# Patient Record
Sex: FEMALE | Race: WHITE
[De-identification: names, ages, dates, MRNs, and addresses within clinical notes are randomized per-mention and may not be internally consistent; named-entity substitution may affect disease eponyms.]

---

## 2017-10-02 PROBLEM — J30.1 ALLERGIC RHINITIS DUE TO POLLEN, UNSPECIFIED CHRONICITY, UNSPECIFIED SEASONALITY: Status: ACTIVE | Noted: 2017-10-02

## 2017-10-02 PROCEDURE — 87624 HPV HI-RISK TYP POOLED RSLT: CPT | Performed by: FAMILY MEDICINE

## 2017-10-02 PROCEDURE — 88175 CYTOPATH C/V AUTO FLUID REDO: CPT | Performed by: FAMILY MEDICINE

## 2017-10-02 PROCEDURE — 81003 URINALYSIS AUTO W/O SCOPE: CPT | Performed by: FAMILY MEDICINE

## 2017-10-05 PROBLEM — R73.03 PREDIABETES: Status: ACTIVE | Noted: 2017-01-01

## 2019-01-08 ENCOUNTER — MYAURORA ACCOUNT LINK (OUTPATIENT)
Dept: OTHER | Age: 64
End: 2019-01-08

## 2019-01-08 ENCOUNTER — HOSPITAL ENCOUNTER (OUTPATIENT)
Dept: CV DIAGNOSTICS | Age: 64
Discharge: HOME | End: 2019-01-08
Attending: INTERNAL MEDICINE
Payer: MEDICARE

## 2019-01-08 ENCOUNTER — PRIOR ORIGINAL RECORDS (OUTPATIENT)
Dept: OTHER | Age: 64
End: 2019-01-08

## 2019-01-08 DIAGNOSIS — I35.0 AORTIC VALVE STENOSIS, ETIOLOGY OF CARDIAC VALVE DISEASE UNSPECIFIED: ICD-10-CM

## 2019-01-09 ENCOUNTER — PRIOR ORIGINAL RECORDS (OUTPATIENT)
Dept: OTHER | Age: 64
End: 2019-01-09

## 2019-01-28 ENCOUNTER — MYAURORA ACCOUNT LINK (OUTPATIENT)
Dept: OTHER | Age: 64
End: 2019-01-28

## 2019-01-28 ENCOUNTER — PRIOR ORIGINAL RECORDS (OUTPATIENT)
Dept: OTHER | Age: 64
End: 2019-01-28

## 2019-01-28 LAB
ALT (SGPT): 17 U/L
AST (SGOT): 23 U/L
CHOLESTEROL, TOTAL: 171 MG/DL
GLUCOSE: 96 MG/DL
HDL CHOLESTEROL: 50 MG/DL
HEMOGLOBIN A1C: 5.9 %
LDL CHOLESTEROL: 99 MG/DL
THYROID STIMULATING HORMONE: 0.78 MLU/L
TRIGLYCERIDES: 112 MG/DL

## 2019-02-14 LAB
BUN: 12 MG/DL
CALCIUM: 9.5 MG/DL
CHLORIDE: 100 MEQ/L
CREATININE, SERUM: 0.62 MG/DL
GLUCOSE: 96 MG/DL
HEMATOCRIT: 40.4 %
HEMOGLOBIN: 13.1 G/DL
PLATELETS: 317 K/UL
POTASSIUM, SERUM: 4.6 MEQ/L
RED BLOOD COUNT: 4.48 X 10-6/U
SGOT (AST): 23 IU/L
SGPT (ALT): 17 IU/L
SODIUM: 137 MEQ/L
WHITE BLOOD COUNT: 6.21 X 10-3/U

## 2019-02-28 VITALS
DIASTOLIC BLOOD PRESSURE: 82 MMHG | HEIGHT: 66 IN | BODY MASS INDEX: 28.12 KG/M2 | SYSTOLIC BLOOD PRESSURE: 142 MMHG | HEART RATE: 84 BPM | WEIGHT: 175 LBS

## 2019-07-30 PROCEDURE — 88305 TISSUE EXAM BY PATHOLOGIST: CPT | Performed by: INTERNAL MEDICINE

## 2020-03-12 PROBLEM — J30.1 ALLERGIC RHINITIS DUE TO POLLEN: Status: ACTIVE | Noted: 2017-10-02

## 2021-04-12 PROBLEM — M17.11 OSTEOARTHRITIS OF RIGHT KNEE, UNSPECIFIED OSTEOARTHRITIS TYPE: Status: ACTIVE | Noted: 2021-04-12

## 2021-05-11 ENCOUNTER — OFFICE VISIT (OUTPATIENT)
Dept: NEUROLOGY | Facility: CLINIC | Age: 66
End: 2021-05-11
Payer: MEDICARE

## 2021-05-11 ENCOUNTER — TELEPHONE (OUTPATIENT)
Dept: PHYSICAL THERAPY | Facility: HOSPITAL | Age: 66
End: 2021-05-11

## 2021-05-11 VITALS
RESPIRATION RATE: 16 BRPM | BODY MASS INDEX: 29 KG/M2 | DIASTOLIC BLOOD PRESSURE: 82 MMHG | WEIGHT: 176 LBS | SYSTOLIC BLOOD PRESSURE: 142 MMHG | HEART RATE: 70 BPM

## 2021-05-11 DIAGNOSIS — R29.898 WEAKNESS OF RIGHT HAND: Primary | ICD-10-CM

## 2021-05-11 DIAGNOSIS — M54.2 NECK PAIN: ICD-10-CM

## 2021-05-11 DIAGNOSIS — M47.22 OSTEOARTHRITIS OF SPINE WITH RADICULOPATHY, CERVICAL REGION: ICD-10-CM

## 2021-05-11 DIAGNOSIS — M25.521 RIGHT ELBOW PAIN: ICD-10-CM

## 2021-05-11 PROCEDURE — 99204 OFFICE O/P NEW MOD 45 MIN: CPT | Performed by: OTHER

## 2021-05-11 NOTE — PROGRESS NOTES
[facility]   Neurology; INITIAL CLINIC VISIT  2021, 11:08 AM     Courtney Alva Patient Status:  No patient class for patient encounter    1955 MRN [id number]   Location [facility] Surgeon: Phylicia Parnell MD;  Location: [address] [place] [address]   • CT HEART W/ CALCIUM SCORING  10/2009    0   • EGD  07/2019    MAC: gastritis, gastric polyps, esophagitis (LA grade A), non obstructive esophageal stricture, hiatal hernia, dilated to mg total) by mouth nightly. 90 tablet 3   • Levothyroxine Sodium 100 MCG Oral Tab Take 1 tablet (100 mcg total) by mouth before breakfast. 90 tablet 3   • amLODIPine Besylate 5 MG Oral Tab Take 1 tablet (5 mg total) by mouth daily.  90 tablet 1   • Rosuvast depression or anxiety  HEMATOLOGY:  denies bruising or excessive bleeding  ENDOCRINE: denies excessive thirst or urination; denies unexpected wt gain or wt loss  ALLERGY/IMM.: denies food or seasonal allergies      PHYSICAL EXAMINATION:  VITAL SIGNS: BP 14 in both arms and legs, including biceps, triceps, knees, ankles,  Babinski sign is negative;   Coordination: Normal FTN     Gait: Normal based,  Romberg sign is negative, Tandem walk is normal      LABS:     Reviewed with patient      IMAGING: reviewed film patient presented with weakness and atrophy in her right FDI and intrinsic hand muscle and R. Elbow pain,   Although EMG did not show ulnar neuropathy, but I suspected she has R.  Ulnar neuropathy at elbow or lower cervical radiculopathy,   MRI cervical spi

## 2021-05-11 NOTE — PROGRESS NOTES
Patient would like to discuss EMG results from 04/08/2021. Patient states right sided elbow, shoulder and neck pain.

## 2021-06-07 ENCOUNTER — APPOINTMENT (OUTPATIENT)
Dept: PHYSICAL THERAPY | Facility: HOSPITAL | Age: 66
End: 2021-06-07
Attending: Other
Payer: MEDICARE

## 2021-06-11 ENCOUNTER — APPOINTMENT (OUTPATIENT)
Dept: PHYSICAL THERAPY | Facility: HOSPITAL | Age: 66
End: 2021-06-11
Attending: Other
Payer: MEDICARE

## 2021-06-15 ENCOUNTER — APPOINTMENT (OUTPATIENT)
Dept: PHYSICAL THERAPY | Facility: HOSPITAL | Age: 66
End: 2021-06-15
Payer: MEDICARE

## 2021-06-18 ENCOUNTER — APPOINTMENT (OUTPATIENT)
Dept: PHYSICAL THERAPY | Facility: HOSPITAL | Age: 66
End: 2021-06-18
Payer: MEDICARE

## 2021-06-22 ENCOUNTER — APPOINTMENT (OUTPATIENT)
Dept: PHYSICAL THERAPY | Facility: HOSPITAL | Age: 66
End: 2021-06-22
Payer: MEDICARE

## 2021-06-25 ENCOUNTER — APPOINTMENT (OUTPATIENT)
Dept: PHYSICAL THERAPY | Facility: HOSPITAL | Age: 66
End: 2021-06-25
Payer: MEDICARE

## 2021-06-29 ENCOUNTER — APPOINTMENT (OUTPATIENT)
Dept: PHYSICAL THERAPY | Facility: HOSPITAL | Age: 66
End: 2021-06-29
Payer: MEDICARE

## 2021-07-02 ENCOUNTER — APPOINTMENT (OUTPATIENT)
Dept: PHYSICAL THERAPY | Facility: HOSPITAL | Age: 66
End: 2021-07-02
Payer: MEDICARE

## 2021-07-27 ENCOUNTER — HOSPITAL ENCOUNTER (OUTPATIENT)
Dept: LAB | Facility: HOSPITAL | Age: 66
Discharge: HOME | End: 2021-07-27
Attending: Other
Payer: MEDICARE

## 2021-07-27 LAB
RHEUMATOID FACT SERPL-ACNC: <10 IU/ML (ref ?–15)
SED RATE-ML: 17 MM/HR

## 2021-07-27 PROCEDURE — 85652 RBC SED RATE AUTOMATED: CPT | Performed by: OTHER

## 2021-07-27 PROCEDURE — 86431 RHEUMATOID FACTOR QUANT: CPT | Performed by: OTHER

## 2021-07-27 PROCEDURE — 36415 COLL VENOUS BLD VENIPUNCTURE: CPT | Performed by: OTHER

## 2021-07-28 ENCOUNTER — TELEPHONE (OUTPATIENT)
Dept: NEUROLOGY | Facility: CLINIC | Age: 66
End: 2021-07-28

## 2021-07-28 ENCOUNTER — OFFICE VISIT (OUTPATIENT)
Dept: ELECTROPHYSIOLOGY | Facility: HOSPITAL | Age: 66
End: 2021-07-28
Attending: Other
Payer: MEDICARE

## 2021-07-28 DIAGNOSIS — M54.2 NECK PAIN: Primary | ICD-10-CM

## 2021-07-28 DIAGNOSIS — M47.22 OSTEOARTHRITIS OF SPINE WITH RADICULOPATHY, CERVICAL REGION: Primary | ICD-10-CM

## 2021-07-28 DIAGNOSIS — R29.898 WEAKNESS OF RIGHT HAND: ICD-10-CM

## 2021-07-28 DIAGNOSIS — M25.521 RIGHT ELBOW PAIN: ICD-10-CM

## 2021-07-28 PROCEDURE — 95886 MUSC TEST DONE W/N TEST COMP: CPT | Performed by: OTHER

## 2021-07-28 PROCEDURE — 95909 NRV CNDJ TST 5-6 STUDIES: CPT | Performed by: OTHER

## 2021-07-28 NOTE — TELEPHONE ENCOUNTER
Patient had EMG with Dr. Tenzin Gamble today. Doctor asked patient to call our office for a new referral for PT. Patient requests that a MyChart confirmation is sent when order is placed.  She is also asking if there is someone within [facility] PT that we recommend fo

## 2021-07-29 NOTE — PROCEDURES
[facility], [address] [place] [address]      PATIENT'S NAME: Carey Cardozo   REFERRING PHYSICIAN: Darrell Nickerson M.D.    PATIENT ACCOUNT #: [de-identified] LOCATION: [facility]   MEDICAL RECORD #: [id number] DATE OF BIRTH: [birth year] the right ulnar nerve, but the conduction velocity was normal though.   Significant denervation change in the right first dorsal interosseous muscle may be due to a combination of right lower cervical radiculopathy, as well as mild entrapment neuropathy at

## 2021-07-30 NOTE — TELEPHONE ENCOUNTER
Pt needs a new order for PT; pt would like to go to a [facility] PT; pls call her or send her a [employer] message when the order is in; pt is hoping that can be done today.

## 2021-08-16 PROBLEM — M54.12 CERVICAL RADICULOPATHY: Status: ACTIVE | Noted: 2021-08-16

## 2021-08-17 ENCOUNTER — OFFICE VISIT (OUTPATIENT)
Dept: NEUROLOGY | Facility: CLINIC | Age: 66
End: 2021-08-17
Payer: MEDICARE

## 2021-08-17 VITALS
SYSTOLIC BLOOD PRESSURE: 110 MMHG | RESPIRATION RATE: 16 BRPM | WEIGHT: 180 LBS | BODY MASS INDEX: 30 KG/M2 | HEART RATE: 84 BPM | DIASTOLIC BLOOD PRESSURE: 78 MMHG

## 2021-08-17 DIAGNOSIS — M54.2 NECK PAIN: ICD-10-CM

## 2021-08-17 DIAGNOSIS — G56.21 ULNAR NEUROPATHY AT ELBOW, RIGHT: ICD-10-CM

## 2021-08-17 DIAGNOSIS — R29.898 WEAKNESS OF RIGHT HAND: ICD-10-CM

## 2021-08-17 DIAGNOSIS — M47.22 OSTEOARTHRITIS OF SPINE WITH RADICULOPATHY, CERVICAL REGION: ICD-10-CM

## 2021-08-17 DIAGNOSIS — M54.12 CERVICAL RADICULOPATHY: Primary | ICD-10-CM

## 2021-08-17 DIAGNOSIS — M25.521 RIGHT ELBOW PAIN: ICD-10-CM

## 2021-08-17 PROBLEM — M15.1 DEGENERATIVE ARTHRITIS OF DISTAL INTERPHALANGEAL JOINT OF INDEX FINGER OF LEFT HAND: Status: ACTIVE | Noted: 2021-08-17

## 2021-08-17 PROBLEM — M77.8 RIGHT ELBOW TENDONITIS: Status: ACTIVE | Noted: 2021-08-17

## 2021-08-17 PROCEDURE — 99214 OFFICE O/P EST MOD 30 MIN: CPT | Performed by: OTHER

## 2021-08-17 NOTE — PROGRESS NOTES
[facility]   Neurology; follow up CLINIC VISIT  2021    Margoth Tillman Patient Status:  No patient class for patient encounter    1955 MRN [id number]   Location [address] [place] [address], [address] [place] [address], [address] [place] [address] PCP [facility]ol[facility] 2029   • COLONOSCOPY,BIOPSY N/A 12/30/2014    Procedure: ESOPHAGOGASTRODUODENOSCOPY, COLONOSCOPY, POSSIBLE BIOPSY, POSSIBLE POLYPECTOMY 90723,30398;  Surgeon: Scarlett Gutierrez MD;  Location: [address] [place] [address]   • CT HEART W/ CALCIUM SCORING  10/2 mouth nightly as needed.  ) 90 tablet 3   • Levothyroxine Sodium 100 MCG Oral Tab Take 1 tablet (100 mcg total) by mouth before breakfast. 90 tablet 3   • amLODIPine Besylate 5 MG Oral Tab Take 1 tablet (5 mg total) by mouth daily.  90 tablet 1   • Rosuvast Location: Right arm, Patient Position: Sitting, Cuff Size: adult)   Pulse 84   Resp 16   Wt 180 lb (81.6 kg)   LMP 12/19/2012   BMI 29.95 kg/m²    Body mass index is 29.95 kg/m². General:  Patient is a 77year old female in no acute distress.   appearance: LABS:   repeated ESR is 17, normal now,   Reviewed with patient      IMAGING: reviewed film or imaging with patient. EMG done by Dr. Vish Smith on 4/6/2021  This is an abnormal study.      1.  There is electrodiagnostic evidence of right median nerve mon Impression/Plan:  (M54.12) Cervical radiculopathy  (primary encounter diagnosis)    (M25.521) Right elbow pain    (R29.898) Weakness of right hand    (M54.2) Neck pain    (M47.22) Osteoarthritis of spine with radiculopathy, cervical region    (G56.21

## 2024-01-08 RX ORDER — AMLODIPINE BESYLATE 5 MG/1
TABLET ORAL
COMMUNITY
End: 2024-01-08 | Stop reason: ALTCHOICE

## 2024-01-08 RX ORDER — ASPIRIN 81 MG/1
81 TABLET ORAL DAILY
Status: ON HOLD | COMMUNITY
End: 2024-02-21

## 2024-01-08 RX ORDER — FAMOTIDINE 20 MG/1
20 TABLET, FILM COATED ORAL 2 TIMES DAILY PRN
COMMUNITY

## 2024-02-12 ENCOUNTER — ANESTHESIA EVENT (OUTPATIENT)
Dept: SURGERY | Facility: HOSPITAL | Age: 69
End: 2024-02-12
Payer: MEDICARE

## 2024-02-14 NOTE — H&P
[facility]  History & Physical    Tita Tillman Patient Status:  Hospital Outpatient Surgery    1955 MRN [id number]   Location [facility] SURGERY Attending Tod Ferreira MD   Hosp Day # 0 PCP Lydia Newton MD     Date of Admission:  (Not on file)    History of Present Illness:  Tita Tillman is a(n) 68 year old female.  The patient has failed conservative treatment for right knee osteoarthritis and has significant limitations in ADL's including ambulation and exercise, and presents for total joint arthroplasty at this time.    History:  Past Medical History:   Diagnosis Date    Aortic valve disorder     ASTHMA     Asthma     GERD     High blood pressure     High cholesterol     Hx of adenomatous colonic polyps     Hypercholesterolemia     Hypertension     Moderate aortic stenosis     Occlusion and stenosis of carotid artery without mention of cerebral infarction     Other and unspecified hyperlipidemia 2008    Prediabetes     SEASONAL ALLERGIES     Unspecified hypothyroidism 2008     Past Surgical History:   Procedure Laterality Date    COLONOSCOPY  2019    MAC: diverticulosis, int hem, repeat     COLONOSCOPY,BIOPSY N/A 2014    Procedure: ESOPHAGOGASTRODUODENOSCOPY, COLONOSCOPY, POSSIBLE BIOPSY, POSSIBLE POLYPECTOMY 91879,39417;  Surgeon: Katarina Baeza MD;  Location: [facility]    CT HEART W/ CALCIUM SCORING  10/2009    0    EGD  2019    MAC: gastritis, gastric polyps, esophagitis (LA grade A), non obstructive esophageal stricture, hiatal hernia, dilated to 20mm, bx neg for HP, celiac, Lazar's. Polyps are fundic gland polyps    EGD N/A 2019    Procedure: ESOPHAGOGASTRODUODENOSCOPY, COLONOSCOPY, POSSIBLE BIOPSY, POSSIBLE POLYPECTOMY 06175, 03629;  Surgeon: Katarina Baeza MD;  Location: [facility]    OTHER SURGICAL HISTORY      oral    UPPER GI ENDOSCOPY,BIOPSY N/A 2014    Procedure:  ESOPHAGOGASTRODUODENOSCOPY, COLONOSCOPY, POSSIBLE BIOPSY, POSSIBLE POLYPECTOMY 94818,27097;  Surgeon: Katarina Baeza MD;  Location: [facility]     Family History   Problem Relation Age of Onset    Heart Attack Father     Heart Disorder Father     Cancer Father         melanoma    High Blood Pressure Mother     High Cholesterol Mother     Hypertension Mother     Other (Other) Mother         arthritis-hammer toes-hypothyroid    Neurological Disorder Brother         SUBARRACHNOID HEMORRHAGE    Neurological Disorder Son         RACHELE      reports that she has never smoked. She has never used smokeless tobacco. She reports current alcohol use. She reports that she does not use drugs.    Allergies:  Allergies   Allergen Reactions    Cyclobenzaprine HIVES    Tizanidine HIVES    Zithromax HIVES    Fish Oil HIVES    Nuts     Pcn [Penicillins] HIVES    Strawberries HIVES    Tums [Calcium Carbonate Antacid] HIVES    Erythromycin NAUSEA ONLY    Polymyxin B NAUSEA ONLY       Home Medications:  No medications prior to admission.       Physical Exam:   General: Alert, orientated x3.  Cooperative.  No apparent distress.  Vital Signs:  Height 5' 5\" (1.651 m), weight 177 lb (80.3 kg), last menstrual period 12/10/2012, not currently breastfeeding.  HEENT: Exam is unremarkable.    Neck: No tenderness to palpitation. No JVD. Supple.   Lungs: Clear to auscultation bilaterally.  Cardiac: Regular rate and rhythm. No murmur.  Abdomen:  Soft, non-distended, non-tender, with no rebound or guarding.   Extremities:  No lower extremity edema noted.  Without clubbing or cyanosis.    The right knee is tender at the medial and lateral joint lines, with crepitus on range of motion    Laboratory Data:  xrays show severe osteoarthritis of the right knee    Impression and Plan:  Patient Active Problem List   Diagnosis    Mixed hyperlipidemia    Moderate aortic stenosis    Osteoarthrosis, unspecified whether generalized or localized,  lower leg    Enthesopathy of hip region    Asymptomatic carotid artery stenosis, unspecified laterality    Hx of adenomatous colonic polyps    Hypothyroidism, unspecified type    Essential hypertension    Mild intermittent asthma without complication    Gastroesophageal reflux disease    Allergic rhinitis due to pollen    Prediabetes    Osteoarthritis of right knee, unspecified osteoarthritis type    Weakness of right hand    Neck pain    Osteoarthritis of spine with radiculopathy, cervical region    Right elbow pain    Cervical radiculopathy    Ulnar neuropathy at elbow, right    Right elbow tendonitis    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

## 2024-02-21 ENCOUNTER — ANESTHESIA (OUTPATIENT)
Dept: SURGERY | Facility: HOSPITAL | Age: 69
End: 2024-02-21
Payer: MEDICARE

## 2024-02-21 ENCOUNTER — HOSPITAL ENCOUNTER (OUTPATIENT)
Facility: HOSPITAL | Age: 69
Setting detail: HOSPITAL OUTPATIENT SURGERY
Discharge: HOME HEALTH | End: 2024-02-21
Attending: ORTHOPAEDIC SURGERY | Admitting: ORTHOPAEDIC SURGERY
Payer: MEDICARE

## 2024-02-21 VITALS
RESPIRATION RATE: 16 BRPM | TEMPERATURE: 99 F | OXYGEN SATURATION: 99 % | HEART RATE: 88 BPM | WEIGHT: 170.25 LBS | BODY MASS INDEX: 28.36 KG/M2 | SYSTOLIC BLOOD PRESSURE: 135 MMHG | HEIGHT: 65 IN | DIASTOLIC BLOOD PRESSURE: 80 MMHG

## 2024-02-21 LAB
ANTIBODY SCREEN: NEGATIVE
RH BLOOD TYPE: POSITIVE

## 2024-02-21 PROCEDURE — 86901 BLOOD TYPING SEROLOGIC RH(D): CPT | Performed by: ORTHOPAEDIC SURGERY

## 2024-02-21 PROCEDURE — 88311 DECALCIFY TISSUE: CPT | Performed by: ORTHOPAEDIC SURGERY

## 2024-02-21 PROCEDURE — 86900 BLOOD TYPING SEROLOGIC ABO: CPT | Performed by: ORTHOPAEDIC SURGERY

## 2024-02-21 PROCEDURE — 97116 GAIT TRAINING THERAPY: CPT

## 2024-02-21 PROCEDURE — 97161 PT EVAL LOW COMPLEX 20 MIN: CPT

## 2024-02-21 PROCEDURE — 76942 ECHO GUIDE FOR BIOPSY: CPT | Performed by: ANESTHESIOLOGY

## 2024-02-21 PROCEDURE — 86850 RBC ANTIBODY SCREEN: CPT | Performed by: ORTHOPAEDIC SURGERY

## 2024-02-21 PROCEDURE — 0SRC0J9 REPLACEMENT OF RIGHT KNEE JOINT WITH SYNTHETIC SUBSTITUTE, CEMENTED, OPEN APPROACH: ICD-10-PCS | Performed by: ORTHOPAEDIC SURGERY

## 2024-02-21 PROCEDURE — 88305 TISSUE EXAM BY PATHOLOGIST: CPT | Performed by: ORTHOPAEDIC SURGERY

## 2024-02-21 DEVICE — IMPLANTABLE DEVICE: Type: IMPLANTABLE DEVICE | Site: KNEE | Status: FUNCTIONAL

## 2024-02-21 RX ORDER — ACETAMINOPHEN 325 MG/1
TABLET ORAL
Status: COMPLETED
Start: 2024-02-21 | End: 2024-02-21

## 2024-02-21 RX ORDER — SENNOSIDES 8.6 MG
17.2 TABLET ORAL NIGHTLY
Status: CANCELLED | OUTPATIENT
Start: 2024-02-21

## 2024-02-21 RX ORDER — DEXAMETHASONE SODIUM PHOSPHATE 10 MG/ML
INJECTION, SOLUTION INTRAMUSCULAR; INTRAVENOUS AS NEEDED
Status: DISCONTINUED | OUTPATIENT
Start: 2024-02-21 | End: 2024-02-21 | Stop reason: SURG

## 2024-02-21 RX ORDER — MEPERIDINE HYDROCHLORIDE 25 MG/ML
12.5 INJECTION INTRAMUSCULAR; INTRAVENOUS; SUBCUTANEOUS AS NEEDED
Status: DISCONTINUED | OUTPATIENT
Start: 2024-02-21 | End: 2024-02-21

## 2024-02-21 RX ORDER — ENEMA 19; 7 G/133ML; G/133ML
1 ENEMA RECTAL ONCE AS NEEDED
Status: CANCELLED | OUTPATIENT
Start: 2024-02-21

## 2024-02-21 RX ORDER — DEXAMETHASONE SODIUM PHOSPHATE 10 MG/ML
8 INJECTION, SOLUTION INTRAMUSCULAR; INTRAVENOUS ONCE
OUTPATIENT
Start: 2024-02-22 | End: 2024-02-22

## 2024-02-21 RX ORDER — HYDROMORPHONE HYDROCHLORIDE 1 MG/ML
0.4 INJECTION, SOLUTION INTRAMUSCULAR; INTRAVENOUS; SUBCUTANEOUS EVERY 5 MIN PRN
Status: DISCONTINUED | OUTPATIENT
Start: 2024-02-21 | End: 2024-02-21

## 2024-02-21 RX ORDER — POLYETHYLENE GLYCOL 3350 17 G/17G
17 POWDER, FOR SOLUTION ORAL DAILY PRN
Status: CANCELLED | OUTPATIENT
Start: 2024-02-21

## 2024-02-21 RX ORDER — NALOXONE HYDROCHLORIDE 0.4 MG/ML
80 INJECTION, SOLUTION INTRAMUSCULAR; INTRAVENOUS; SUBCUTANEOUS AS NEEDED
Status: DISCONTINUED | OUTPATIENT
Start: 2024-02-21 | End: 2024-02-21

## 2024-02-21 RX ORDER — OXYCODONE HYDROCHLORIDE 5 MG/1
5 TABLET ORAL EVERY 4 HOURS PRN
OUTPATIENT
Start: 2024-02-21

## 2024-02-21 RX ORDER — LABETALOL HYDROCHLORIDE 5 MG/ML
5 INJECTION, SOLUTION INTRAVENOUS EVERY 5 MIN PRN
Status: DISCONTINUED | OUTPATIENT
Start: 2024-02-21 | End: 2024-02-21

## 2024-02-21 RX ORDER — CEFAZOLIN SODIUM/WATER 2 G/20 ML
SYRINGE (ML) INTRAVENOUS
Status: DISCONTINUED
Start: 2024-02-21 | End: 2024-02-21

## 2024-02-21 RX ORDER — ROPIVACAINE HYDROCHLORIDE 5 MG/ML
INJECTION, SOLUTION EPIDURAL; INFILTRATION; PERINEURAL AS NEEDED
Status: DISCONTINUED | OUTPATIENT
Start: 2024-02-21 | End: 2024-02-21 | Stop reason: SURG

## 2024-02-21 RX ORDER — ONDANSETRON 2 MG/ML
4 INJECTION INTRAMUSCULAR; INTRAVENOUS EVERY 6 HOURS PRN
Status: CANCELLED | OUTPATIENT
Start: 2024-02-21

## 2024-02-21 RX ORDER — SODIUM CHLORIDE, SODIUM LACTATE, POTASSIUM CHLORIDE, CALCIUM CHLORIDE 600; 310; 30; 20 MG/100ML; MG/100ML; MG/100ML; MG/100ML
INJECTION, SOLUTION INTRAVENOUS CONTINUOUS
Status: DISCONTINUED | OUTPATIENT
Start: 2024-02-21 | End: 2024-02-21

## 2024-02-21 RX ORDER — METOCLOPRAMIDE HYDROCHLORIDE 5 MG/ML
10 INJECTION INTRAMUSCULAR; INTRAVENOUS EVERY 8 HOURS PRN
Status: CANCELLED | OUTPATIENT
Start: 2024-02-21

## 2024-02-21 RX ORDER — TRANEXAMIC ACID 10 MG/ML
INJECTION, SOLUTION INTRAVENOUS AS NEEDED
Status: DISCONTINUED | OUTPATIENT
Start: 2024-02-21 | End: 2024-02-21 | Stop reason: SURG

## 2024-02-21 RX ORDER — DIPHENHYDRAMINE HYDROCHLORIDE 50 MG/ML
12.5 INJECTION INTRAMUSCULAR; INTRAVENOUS EVERY 4 HOURS PRN
Status: CANCELLED | OUTPATIENT
Start: 2024-02-21

## 2024-02-21 RX ORDER — MELATONIN
325
Status: CANCELLED | OUTPATIENT
Start: 2024-02-21

## 2024-02-21 RX ORDER — PHENYLEPHRINE HCL 10 MG/ML
VIAL (ML) INJECTION AS NEEDED
Status: DISCONTINUED | OUTPATIENT
Start: 2024-02-21 | End: 2024-02-21 | Stop reason: SURG

## 2024-02-21 RX ORDER — MIDAZOLAM HYDROCHLORIDE 1 MG/ML
INJECTION INTRAMUSCULAR; INTRAVENOUS AS NEEDED
Status: DISCONTINUED | OUTPATIENT
Start: 2024-02-21 | End: 2024-02-21 | Stop reason: SURG

## 2024-02-21 RX ORDER — CEFAZOLIN SODIUM/WATER 2 G/20 ML
2 SYRINGE (ML) INTRAVENOUS EVERY 8 HOURS
Status: CANCELLED | OUTPATIENT
Start: 2024-02-21 | End: 2024-02-22

## 2024-02-21 RX ORDER — OXYCODONE HYDROCHLORIDE 5 MG/1
2.5 TABLET ORAL EVERY 4 HOURS PRN
OUTPATIENT
Start: 2024-02-21

## 2024-02-21 RX ORDER — HYDROCODONE BITARTRATE AND ACETAMINOPHEN 5; 325 MG/1; MG/1
2 TABLET ORAL ONCE AS NEEDED
Status: COMPLETED | OUTPATIENT
Start: 2024-02-21 | End: 2024-02-21

## 2024-02-21 RX ORDER — METOCLOPRAMIDE HYDROCHLORIDE 5 MG/ML
10 INJECTION INTRAMUSCULAR; INTRAVENOUS EVERY 8 HOURS PRN
Status: DISCONTINUED | OUTPATIENT
Start: 2024-02-21 | End: 2024-02-21

## 2024-02-21 RX ORDER — ALBUTEROL SULFATE 90 UG/1
2 AEROSOL, METERED RESPIRATORY (INHALATION) EVERY 4 HOURS PRN
Status: CANCELLED | OUTPATIENT
Start: 2024-02-21

## 2024-02-21 RX ORDER — HYDROMORPHONE HYDROCHLORIDE 1 MG/ML
0.6 INJECTION, SOLUTION INTRAMUSCULAR; INTRAVENOUS; SUBCUTANEOUS EVERY 5 MIN PRN
Status: DISCONTINUED | OUTPATIENT
Start: 2024-02-21 | End: 2024-02-21

## 2024-02-21 RX ORDER — TRAMADOL HYDROCHLORIDE 50 MG/1
50 TABLET ORAL EVERY 6 HOURS SCHEDULED
OUTPATIENT
Start: 2024-02-21

## 2024-02-21 RX ORDER — HYDROCODONE BITARTRATE AND ACETAMINOPHEN 5; 325 MG/1; MG/1
1 TABLET ORAL ONCE AS NEEDED
Status: COMPLETED | OUTPATIENT
Start: 2024-02-21 | End: 2024-02-21

## 2024-02-21 RX ORDER — HYDROMORPHONE HYDROCHLORIDE 1 MG/ML
0.2 INJECTION, SOLUTION INTRAMUSCULAR; INTRAVENOUS; SUBCUTANEOUS EVERY 2 HOUR PRN
OUTPATIENT
Start: 2024-02-21

## 2024-02-21 RX ORDER — ACETAMINOPHEN 325 MG/1
650 TABLET ORAL ONCE
Status: COMPLETED | OUTPATIENT
Start: 2024-02-21 | End: 2024-02-21

## 2024-02-21 RX ORDER — CEFAZOLIN SODIUM/WATER 2 G/20 ML
2 SYRINGE (ML) INTRAVENOUS ONCE
Status: COMPLETED | OUTPATIENT
Start: 2024-02-21 | End: 2024-02-21

## 2024-02-21 RX ORDER — HYDROMORPHONE HYDROCHLORIDE 1 MG/ML
0.2 INJECTION, SOLUTION INTRAMUSCULAR; INTRAVENOUS; SUBCUTANEOUS EVERY 5 MIN PRN
Status: DISCONTINUED | OUTPATIENT
Start: 2024-02-21 | End: 2024-02-21

## 2024-02-21 RX ORDER — ACETAMINOPHEN 500 MG
1000 TABLET ORAL ONCE AS NEEDED
Status: COMPLETED | OUTPATIENT
Start: 2024-02-21 | End: 2024-02-21

## 2024-02-21 RX ORDER — ONDANSETRON 2 MG/ML
4 INJECTION INTRAMUSCULAR; INTRAVENOUS EVERY 6 HOURS PRN
Status: DISCONTINUED | OUTPATIENT
Start: 2024-02-21 | End: 2024-02-21

## 2024-02-21 RX ORDER — DIPHENHYDRAMINE HCL 25 MG
25 CAPSULE ORAL EVERY 4 HOURS PRN
Status: CANCELLED | OUTPATIENT
Start: 2024-02-21

## 2024-02-21 RX ORDER — TIZANIDINE 2 MG/1
1 TABLET ORAL EVERY 6 HOURS PRN
OUTPATIENT
Start: 2024-02-21

## 2024-02-21 RX ORDER — DOCUSATE SODIUM 100 MG/1
100 CAPSULE, LIQUID FILLED ORAL 2 TIMES DAILY
Status: CANCELLED | OUTPATIENT
Start: 2024-02-21

## 2024-02-21 RX ORDER — ACETAMINOPHEN 500 MG
1000 TABLET ORAL ONCE
Status: DISCONTINUED | OUTPATIENT
Start: 2024-02-21 | End: 2024-02-21 | Stop reason: HOSPADM

## 2024-02-21 RX ORDER — ASPIRIN 81 MG/1
81 TABLET ORAL 2 TIMES DAILY
Status: CANCELLED | OUTPATIENT
Start: 2024-02-21

## 2024-02-21 RX ORDER — TRANEXAMIC ACID 10 MG/ML
1000 INJECTION, SOLUTION INTRAVENOUS ONCE
Status: DISCONTINUED | OUTPATIENT
Start: 2024-02-21 | End: 2024-02-21 | Stop reason: HOSPADM

## 2024-02-21 RX ORDER — BISACODYL 10 MG
10 SUPPOSITORY, RECTAL RECTAL
Status: CANCELLED | OUTPATIENT
Start: 2024-02-21

## 2024-02-21 RX ORDER — HYDROMORPHONE HYDROCHLORIDE 1 MG/ML
0.4 INJECTION, SOLUTION INTRAMUSCULAR; INTRAVENOUS; SUBCUTANEOUS EVERY 2 HOUR PRN
OUTPATIENT
Start: 2024-02-21

## 2024-02-21 RX ORDER — DIPHENHYDRAMINE HYDROCHLORIDE 50 MG/ML
25 INJECTION INTRAMUSCULAR; INTRAVENOUS ONCE AS NEEDED
Status: CANCELLED | OUTPATIENT
Start: 2024-02-21 | End: 2024-02-21

## 2024-02-21 RX ORDER — SODIUM CHLORIDE 9 MG/ML
INJECTION, SOLUTION INTRAVENOUS CONTINUOUS
Status: CANCELLED | OUTPATIENT
Start: 2024-02-21

## 2024-02-21 RX ADMIN — CEFAZOLIN SODIUM/WATER 2 G: 2 G/20 ML SYRINGE (ML) INTRAVENOUS at 10:40:00

## 2024-02-21 RX ADMIN — PHENYLEPHRINE HCL 100 MCG: 10 MG/ML VIAL (ML) INJECTION at 11:20:00

## 2024-02-21 RX ADMIN — PHENYLEPHRINE HCL 100 MCG: 10 MG/ML VIAL (ML) INJECTION at 11:33:00

## 2024-02-21 RX ADMIN — DEXAMETHASONE SODIUM PHOSPHATE 4 MG: 10 INJECTION, SOLUTION INTRAMUSCULAR; INTRAVENOUS at 10:49:00

## 2024-02-21 RX ADMIN — SODIUM CHLORIDE, SODIUM LACTATE, POTASSIUM CHLORIDE, CALCIUM CHLORIDE: 600; 310; 30; 20 INJECTION, SOLUTION INTRAVENOUS at 10:37:00

## 2024-02-21 RX ADMIN — MIDAZOLAM HYDROCHLORIDE 2 MG: 1 INJECTION INTRAMUSCULAR; INTRAVENOUS at 10:40:00

## 2024-02-21 RX ADMIN — SODIUM CHLORIDE, SODIUM LACTATE, POTASSIUM CHLORIDE, CALCIUM CHLORIDE: 600; 310; 30; 20 INJECTION, SOLUTION INTRAVENOUS at 11:05:00

## 2024-02-21 RX ADMIN — TRANEXAMIC ACID 1000 MG: 10 INJECTION, SOLUTION INTRAVENOUS at 10:52:00

## 2024-02-21 RX ADMIN — ROPIVACAINE HYDROCHLORIDE 20 ML: 5 INJECTION, SOLUTION EPIDURAL; INFILTRATION; PERINEURAL at 10:49:00

## 2024-02-21 NOTE — ANESTHESIA PREPROCEDURE EVALUATION
PRE-OP EVALUATION    Patient Name: Tita Tillman    Admit Diagnosis: PRIMARY OSTEOARTHRITIS OF RIGHT KNEE    Pre-op Diagnosis: PRIMARY OSTEOARTHRITIS OF RIGHT KNEE    RIGHT TOTAL KNEE ARTHROPLASTY    Anesthesia Procedure: RIGHT TOTAL KNEE ARTHROPLASTY (Right)    Surgeon(s) and Role:     * Tod Ferreira MD - Primary    Pre-op vitals reviewed.  Temp: 98 °F (36.7 °C)  Pulse: 82  Resp: 16  BP: 148/50  SpO2: 98 %  Body mass index is 28.33 kg/m².    Current medications reviewed.  Hospital Medications:   acetaminophen (Tylenol Extra Strength) tab 1,000 mg  1,000 mg Oral Once    lactated ringers infusion   Intravenous Continuous    ceFAZolin (Ancef) 2 g in 20mL IV syringe premix  2 g Intravenous Once    tranexamic acid in sodium chloride 0.7% (Cyklokapron) 1000 mg/100mL infusion premix 1,000 mg  1,000 mg Intravenous Once    ceFAZolin (Ancef) 2 g/20mL IV syringe premix        clonidine/epinephrine/ropivacaine/ketorolac in 0.9% NaCl 60 mL pain cocktail syringe for knee arthroplasty   Infiltration Once (Intra-Op)       Outpatient Medications:     Medications Prior to Admission   Medication Sig Dispense Refill Last Dose    aspirin 81 MG Oral Tab EC Take 1 tablet (81 mg total) by mouth daily.   2/12/2024 at 2000    famotidine 20 MG Oral Tab Take 1 tablet (20 mg total) by mouth 2 (two) times daily as needed for Heartburn.   2/20/2024 at 1500    AMLODIPINE 5 MG Oral Tab TAKE 1 TABLET BY MOUTH ONCE DAILY . APPOINTMENT REQUIRED FOR FUTURE REFILLS 90 tablet 0 2/20/2024 at 2000    Levothyroxine Sodium 100 MCG Oral Tab Take 1 tablet (100 mcg total) by mouth before breakfast. 90 tablet 3 2/21/2024 at 0600    Rosuvastatin Calcium (CRESTOR) 5 MG Oral Tab Take 1 tablet (5 mg total) by mouth nightly. (Patient taking differently: Take 1 tablet (5 mg total) by mouth. 5 nights per week) 90 tablet 3 2/20/2024 at 2000    Probiotic Product (PROBIOTIC ADVANCED OR) Take by mouth.       MAGNESIUM GLUCONATE  mg.     2/17/2024     Cholecalciferol (VITAMIN D3) 2000 UNITS Oral Tab Take 2 tablets (4,000 Units total) by mouth daily.   2/20/2024 at 0800    Homeopathic Products ([employer] LEG CRAMPS PAIN RELIEF) Oral Tab Take  by mouth. 2 tabs in evening prn       CITRACAL + D OR daily   2/17/2024    Albuterol Sulfate HFA (VENTOLIN HFA) 108 (90 Base) MCG/ACT Inhalation Aero Soln Inhale 2 puffs into the lungs every 4 (four) hours as needed for Wheezing or Shortness of Breath. 1 Inhaler 3 More than a month       Allergies: Chlorhexidine, Cyclobenzaprine, Tizanidine, Zithromax, Fish oil, Nuts, Pcn [penicillins], Strawberries, Tums [calcium carbonate antacid], Erythromycin, and Polymyxin b      Anesthesia Evaluation        Anesthetic Complications           GI/Hepatic/Renal      (+) GERD and well controlled                          Cardiovascular      ECG reviewed.  Exercise tolerance: good     MET: >4      (+) hypertension             (+) valvular problems/murmurs and AS                       Endo/Other           (+) hypothyroidism                       Pulmonary      (+) asthma                     Neuro/Psych                 (+) neuromuscular disease             7/2023 ECHO findings:  Summary:     1. Left ventricle: The cavity size is normal. Wall thickness is mildly increased. Systolic function is normal. The estimated ejection fraction is 60-65%. Wall motion is normal; there are no regional wall motion abnormalities. The study is not technically sufficient to allow evaluation of LV diastolic function.   2. Aortic valve: There is mild stenosis. There is mild regurgitation. Peak velocity (S): 2.7m/sec. Mean gradient (S): 17mm Hg. Peak gradient (S): 34mm Hg.   3. Right ventricle: Estimation of the right ventricular systolic pressure is within the normal range.   4. Tricuspid valve: There is mild regurgitation.   5. Pericardium, extracardiac: There is no significant pericardial effusion.     4/2021 ECHO findings:  Summary:     1. Left ventricle: The cavity  size is normal. There is mild concentric hypertrophy. Systolic function is normal. The estimated ejection fraction is 60-65%. Wall motion is normal; there are no regional wall motion abnormalities. Left ventricular diastolic function parameters are normal.   2. Right ventricle: Systolic function is normal. Estimation of the right ventricular systolic pressure is within the normal range.   3. Aortic valve: A bicuspid morphology cannot be excluded. Thickening, consistent with sclerosis. Peak velocity (S): 2.1m/sec. Mean gradient (S): 11mm Hg. Peak gradient (S): 18mm Hg.     8/7/2023 Cardiology clearance notes the following:  \"Intermediate cardiac risk patient who is scheduled to have an intermediate cardiac risk procedure. As there are no active cardiac conditions (decompensated heart failure, unstable angina, or malignant arrhythmias), it is reasonable to proceed with surgery at acceptable cardiac risk. All patients are at some degree of risk for cardiovascular complications (myocardial infarction, stroke and death) from surgery; I believe that the risks of delaying the procedure would be greater.\"          Past Surgical History:   Procedure Laterality Date    COLONOSCOPY  07/2019    MAC: diverticulosis, int hem, repeat 2029    COLONOSCOPY,BIOPSY N/A 12/30/2014    Procedure: ESOPHAGOGASTRODUODENOSCOPY, COLONOSCOPY, POSSIBLE BIOPSY, POSSIBLE POLYPECTOMY 34224,71021;  Surgeon: Katarina Baeza MD;  Location: [facility]    CT HEART W/ CALCIUM SCORING  10/2009    0    EGD  07/2019    MAC: gastritis, gastric polyps, esophagitis (LA grade A), non obstructive esophageal stricture, hiatal hernia, dilated to 20mm, bx neg for HP, celiac, Lazar's. Polyps are fundic gland polyps    EGD N/A 07/30/2019    Procedure: ESOPHAGOGASTRODUODENOSCOPY, COLONOSCOPY, POSSIBLE BIOPSY, POSSIBLE POLYPECTOMY 41576, 52066;  Surgeon: Katarina Baeza MD;  Location: [facility]    OTHER SURGICAL HISTORY      oral     UPPER GI ENDOSCOPY,BIOPSY N/A 12/30/2014    Procedure: ESOPHAGOGASTRODUODENOSCOPY, COLONOSCOPY, POSSIBLE BIOPSY, POSSIBLE POLYPECTOMY 51031,82355;  Surgeon: Katarina Baeza MD;  Location: [facility]     Social History     Socioeconomic History    Marital status:     Number of children: 4   Tobacco Use    Smoking status: Never    Smokeless tobacco: Never   Vaping Use    Vaping Use: Never used   Substance and Sexual Activity    Alcohol use: Yes    Drug use: No   Other Topics Concern    Caffeine Concern Yes     Comment: 1 cup coffee/day    Exercise Yes     Comment: walking     History   Drug Use No     Available pre-op labs reviewed.               Airway      Mallampati: III  Mouth opening: >3 FB  TM distance: > 6 cm  Neck ROM: full Cardiovascular    Cardiovascular exam normal.         Dental             Pulmonary    Pulmonary exam normal.                 Other findings  Dentition grossly normal.            ASA: 3   Plan: spinal and general  NPO status verified and patient meets guidelines.    Post-procedure pain management plan discussed with surgeon and patient.  Surgeon requests: regional block  Comment: Adductor canal blockade for postop analgesia.  Plan/risks discussed with: patient                Present on Admission:  **None**

## 2024-02-21 NOTE — PHYSICAL THERAPY NOTE
PHYSICAL THERAPY EVALUATION - INPATIENT     Room Number: [facility]  Evaluation Date: 2/21/2024  Type of Evaluation: Initial  Physician Order: PT Eval and Treat    Presenting Problem: s/p R TKA on 2/21/24  Co-Morbidities : asthma, HTN  Reason for Therapy: Mobility Dysfunction and Discharge Planning    PHYSICAL THERAPY ASSESSMENT   Patient is a 68 year old female admitted 2/21/2024 for R TKA.   Patient is currently functioning near baseline with bed mobility, transfers, gait, and stair negotiation. Prior to admission, patient's baseline is independent with no AD.     Patient will benefit from continued skilled PT Services at discharge to promote functional independence in home.  Anticipate patient will return home with home health PT.    PLAN  Patient has been evaluated and presents with no skilled Physical Therapy needs at this time.  Patient discharged from Physical Therapy services.  Please re-order if a new functional limitation presents during this admission.    GOALS  Patient was able to achieve the following goals ...    Patient was able to transfer Safely with supervision   Patient able to ambulate on level surfaces Safely with supervision         HOME SITUATION  Type of Home: House   Home Layout: Two level  Stairs to Enter : 2  Railing: No  Stairs to Bedroom: 14  Railing: Yes    Lives With: Spouse  Drives: Yes  Patient Owned Equipment: Rolling walker;Cane  Patient Regularly Uses: Glasses    Prior Level of Climax: Pt is typically independent with ADLs and ambulates with no AD.     SUBJECTIVE  Pt pleasant and cooperative during session      OBJECTIVE  Precautions: Bed/chair alarm  Fall Risk: Standard fall risk    WEIGHT BEARING RESTRICTION  Weight Bearing Restriction: R lower extremity        R Lower Extremity: Weight Bearing as Tolerated       PAIN ASSESSMENT  Rating: Unable to rate  Location: R knee  Management Techniques: Activity  promotion;Relaxation;Repositioning    COGNITION  Overall Cognitive Status:  WFL - within functional limits    RANGE OF MOTION AND STRENGTH ASSESSMENT  Upper extremity ROM and strength are within functional limits     Lower extremity ROM is within functional limits, except RLE s/p TKA    Lower extremity strength is within functional limits, except RLE s/p TKA      BALANCE  Static Sitting: Good  Dynamic Sitting: Good  Static Standing: Fair -  Dynamic Standing: Fair -    ADDITIONAL TESTS                                    ACTIVITY TOLERANCE   Elevated BP, RN notified                      O2 WALK       NEUROLOGICAL FINDINGS                        AM-PAC '6-Clicks' INPATIENT SHORT FORM - BASIC MOBILITY  How much difficulty does the patient currently have...  Patient Difficulty: Turning over in bed (including adjusting bedclothes, sheets and blankets)?: A Little   Patient Difficulty: Sitting down on and standing up from a chair with arms (e.g., wheelchair, bedside commode, etc.): A Little   Patient Difficulty: Moving from lying on back to sitting on the side of the bed?: A Little   How much help from another person does the patient currently need...   Help from Another: Moving to and from a bed to a chair (including a wheelchair)?: A Little   Help from Another: Need to walk in hospital room?: A Little   Help from Another: Climbing 3-5 steps with a railing?: A Little       AM-PAC Score:  Raw Score: 18   Approx Degree of Impairment: 46.58%   Standardized Score (AM-PAC Scale): 43.63   CMS Modifier (G-Code): CK    FUNCTIONAL ABILITY STATUS  Gait Assessment   Functional Mobility/Gait Assessment  Gait Assistance: Supervision  Distance (ft): 150  Assistive Device: Rolling walker  Pattern: R Decreased stance time  Stairs: Stairs;Stoop/curb;Car transfer  How Many Stairs: 4  Device: 1 Rail  Assist: Supervision  Pattern: Ascend and Descend  Ascend and Descend : Step to  Stoop/Curb: Pt educated on how to ascend and descend a stoop  step with RW and verbalized understanding  Car transfer: Pt educated on how to perform a car transfer and verbalized understanding    Skilled Therapy Provided: Per RN okay to work with pt. Pt received in supine and was agreeable to PT session.     Bed Mobility:  Rolling: NT  Supine to sit: supervision   Sit to supine: supervision     Transfer Mobility:  Sit to stand: supervision   Stand to sit: supervision  Gait = pt ambulated with RW and supervision    Pt ambulated to and from the bathroom with RW and completed pericare independent.     Pt educated on LB dressing and verbalized understanding.     Pt noted to have small amount of blood dripping from knee bandage. RN notified.     Therapist's comments:Pt educated on role of therapy, goals for session, safety, fall prevention, WBAT, and discharge planning.    Exercise/Education Provided:  Bed mobility  Energy conservation  Functional activity tolerated  Gait training  Posture  ROM  Strengthening    Patient End of Session: In bed;Needs met;RN aware of session/findings;Call light within reach;All patient questions and concerns addressed;Alarm set;Family present    Patient Evaluation Complexity Level:  History Low - no personal factors and/or co-morbidities   Examination of body systems Low - addressing 1-2 elements   Clinical Presentation Low - Stable   Clinical Decision Making Low Complexity       PT Session Time: 30 minutes  Gait Training: 15 minutes

## 2024-02-21 NOTE — CM/SW NOTE
02/21/24 1200   CM/SW Referral Data   Referral Source Physician   Reason for Referral Discharge planning   Informant EMR;Clinical Staff Member     Pt is s/p R tka- pre-op plan [facility].    PT/OT pending.

## 2024-02-21 NOTE — ANESTHESIA PROCEDURE NOTES
Spinal Block    Date/Time: 2/21/2024 10:41 AM    Performed by: Stanley Bahena MD  Authorized by: Stanley Bahena MD      General Information and Staff    Start Time:  2/21/2024 10:41 AM  End Time:  2/21/2024 10:46 AM  Anesthesiologist:  Stanley Bahena MD  Performed by:  Anesthesiologist  Site identification: surface landmarks    Preanesthetic Checklist: patient identified, IV checked, risks and benefits discussed, monitors and equipment checked, pre-op evaluation, timeout performed, anesthesia consent and sterile technique used      Procedure Details    Patient Position:  Sitting  Prep: ChloraPrep    Monitoring:  Cardiac monitor, heart rate and continuous pulse ox  Approach:  Midline  Location:  L3-4  Injection Technique:  Single-shot    Needle    Needle Type:  Sprotte  Needle Gauge:  24 G  Needle Length:  3.5 in    Assessment    Sensory Level:   Events: clear CSF, CSF aspirated, well tolerated and blood negative      Additional Comments     Patient in sitting position with ASA monitors on.  Lumbar area prepped and draped in sterile fashion.  Lido 1% skin infiltration at L3-L4 interspace.  21G 30mm introducer needle placed midline at L3-L4 interspace.  24G Sprotte 3.5\" spinal needle advanced through introducer successfully into intrathecal space with clear CSF free flow.  After clear CSF aspirate, mepivicaine 2%PF 2.0 ml given intrathecally.  No heme or paresthesias noted.  Patient tolerated procedure well without any apparent complications.

## 2024-02-21 NOTE — OPERATIVE REPORT
DATE OF PROCEDURE:  2/21/2024  PREOPERATIVE DIAGNOSIS: Right knee osteoarthritis.   POSTOPERATIVE DIAGNOSIS: Right knee osteoarthritis.   PROCEDURE PERFORMED: Cemented right total knee arthroplasty.   SURGEON:  Tod Ferreira M.D.  FIRST ASSISTANT: Jerzy Villanueva    SECOND ASSISTANT:  Anthony Zepeda  ANESTHESIA: Spinal plus femoral nerve block.   INDICATIONS: The patient has severe knee osteoarthritis that has not responded to conservative treatment including antiinflammatories and injections.  The patient's pain has limited activities of daily living including ambulation and exercise.    DESCRIPTION OF PROCEDURE: The patient was brought to the operating room and under spinal anesthetic, the right lower extremity was sterilely prepped and draped.  Preoperative antibiotics had been administered.   A high thigh tourniquet was inflated to 275.   It was medically necessary for the presence of the assistants listed for safe patient care.  This included positioning of the leg, holding of retractors to protect the underlying neurovascular structures and soft tissues.  It was required for the assistants to hold retractors to protect soft tissues while the primary surgeon made the bone cuts on the femur, the tibia, and the patella.  The assistants also held the leg stable and positioned while the primary surgeon made the approach, and the bone cuts, and affixed the guides and later the components to the bones.  An anterior incision was made, medial and lateral flaps were created. A medial parapatellar arthrotomy was created. The patella was everted, the knee was flexed.  Severe arthritic changes with areas of eburnated bone were noted.  A drill was placed in the distal femur and a 6-degree 10 mm cut was made.  The [employer] rotating platform system was used.   Sizing was performed and a size 6 cutting block was selected to make anterior, posterior, chamfer and box cuts for a cruciate-sacrificing knee. Attention was turned to the  tibia. An external alignment guide was utilized to take 10 mm off the less involved lateral side. Sizing was performed and a size 6 fit well. There were equal medial and lateral gaps when checked with a spacer.  Equal flexion and extension gaps were obtained. The stem cut was made for the tibia and 10 mm was taken off the posterior patella. Sizing was performed and a size 38 patella was selected. Three drill holes were placed.   Trial was performed with a 6 femur, 6 tibia, 6 mm insert and 38 mm patella. This gave good varus and valgus stability, good flexion and extension, good tracking of the patella.  Distal femoral condyle drill holes were made using the trial template.  Final components the same size as the trials were utilized.   Trial components were removed. Bony surfaces were irrigated and dried. Final components were precoated with cement which had been mixed under vacuum conditions. The bony surfaces were precoated as well. Components were impacted into place and excess cement removed. The knee was held in extension while the cement hardened.   The tourniquet was let down, bleeders were cauterized.  Lavage was performed. The arthrotomy was closed with a barbed running suture. Subcutaneous tissue was closed after further irrigation. This was closed with inverted 2-0 Vicryl for the subcutaneous tissue and staples for the skin. An aquacell dressing plus gauze covering was applied.  A lightly compressive dressing from toe to groin was applied. Estimated blood loss was 150 mL. There were no complications. There were no specimens.   The patient was brought to the PACU in stable condition.

## 2024-02-21 NOTE — DISCHARGE INSTRUCTIONS
Sometimes managing your health at home requires assistance.  The [facility] team has recognized your preference to use [facility].  They can be reached by phone at [phone].  The fax number for your reference is [phone].  A representative from the home health agency will contact you or your family to schedule your first visit.       Knee Replacement Discharge Instructions    Activity    Bathing  No tub baths, pools, or saunas until cleared by surgeon (about 4-6 weeks because it takes that long for the incision on the skin to heal and be a barrier to prevent infection).  When allowed to shower:    IF AQUACEL - dressing is waterproof and does not require being covered to keep it dry.  Pat dressing and surrounding skin dry after shower              AQUACEL    Gauze dressings are NOT waterproof and REQUIRE being covered with a waterproof barrier to keep the dressing and the incision dry.  SARAN WRAP, GLAD WRAP, and PRESS N SEAL WORK  REALLY WELL BUT ANY PLASTIC WRAP WILL DO.   Do not wash incision.   Remove entire wrapping and old dressing (if Gauze) after showering. Pat dry if necessary WITH A CLEAN TOWEL and cover incision with dry sterile gauze and paper tape. For other types of dressings, follow surgeon’s orders.                           GAUZE          Driving  Do not drive until cleared by surgeon. This is usually in four to six weeks after surgery. Discuss at follow-up office visit.   Not allowed while taking narcotic pain medication or muscle relaxants.    Sex  Usually allowed after four to six weeks - check with surgeon at your office visit.    Return to work  Usually allowed after four to six weeks. Discuss specific work activities with your surgeon.    Restrictions  For knee replacement surgery, follow instructions provided by physical therapy.  Do NOT put a pillow under your knee as it may be more difficult to straighten afterwards.    No smoking  Avoid smoking. It is  known to cause breathing problems and can decrease the rate of healing.    Incision care/Dressing changes  Wash hands before and after dressing changes.  FOR DRY GAUZE DRESSING:  Change dressing daily using dry sterile gauze and paper tape once Aquacel (waterproof) dressing changed (which is about 7 days after surgery). Continue this until you follow up in the office with your surgeon. Have knee bent when changing dressing for more ease of bending afterwards.  There could be a small amount of redness around the staples or incision; this is normal.  Watch for increased redness, warmth, any odor, increased drainage or opening of the incision. A little clear yellow or blood tinged drainage is normal up to 2 weeks after surgery but it should be less every day until it stops.  Call physician if you notice any concerning changes.  Sutures/staples will be removed at first office visit (10 days- 3 weeks).                       GAUZE                                               Medication  Anticoagulants = blood thinners (Xarelto, Eliquis, Lovenox, Coumadin, or Aspirin)  Pill or shot form depending on what your physician orders.   IF placed on Coumadin, you may also need lab work done for monitoring.  You will bleed easier and bruise easier while on these medications.     Usually you will be on a blood thinner for about 2-5 weeks depending what physician orders.  Contact your physician if you have signs of bruising, nose bleeds or blood in your urine. You may consider using electric razors and soft bristle toothbrushes.  Do not take aspirin while taking blood thinners unless ordered by your physician.  Review anticoagulant education information sheet provided.    Discomfort  Surgical discomfort is normal for one to two months.  Have realistic goals and keep a positive outlook.  You may need pain medication regularly (every 4-6 hours) the first 2 weeks and then begin to decrease how often you are taking it.  Take pain  medication as prescribed with food, especially before therapy, allowing 30-60 minutes to take effect.  Do not drink alcohol while on pain medication.  Keep pain manageable; pain should not disrupt your sleep or activities like getting out of bed or walking.  As you have less discomfort, decrease the amount of pain medication you take. Use plain Tylenol (acetaminophen) for less severe pain.  Some pain medications have Tylenol (acetaminophen) in them such as Norco and Percocet. Do NOT take Tylenol (acetaminophen) within 4 hours of a dose of these medications.  Apply ice or cold therapy to surgical site for 20 minutes at least four times a day, especially after therapy. Be sure there is a thin cloth barrier between skin and ice or cold therapy.  Change position at least every 45 minutes while awake to avoid stiffness or increased discomfort.  For knee replacements- may elevate your leg by placing a pillow under entire leg. Do not place pillow only under the knee.  Have realistic goals and keep a positive outlook.  Deep breathing and relaxation techniques and distractions can help!  If you focus on something else, you do not experience the pain the same. Take advantage of everything available to your to help control you discomfort.  Contact physician if discomfort does not respond to pain medication.    Body changes  Constipation is common with the use of narcotics.  Eat fiber rich foods and drink plenty of fluids.  Use stool softeners such as Colace or Senakot while on narcotics, and laxatives such as Miralax or Milk of Magnesia if needed.   An enema or suppository may be needed if above measures do not work.    Prevention of infection and promotion of healing  Good hand washing is important. Everyone should wash their hands or use hand  as soon as they walk in your house-whether they live there or are visiting.  Keep bed linen/clothing freshly laundered.  Do not allow others or pets to touch your  incision.  Avoid people that have colds or the flu.  Your surgeon may recommend that you take antibiotics before you undergo any dental or other invasive surgical procedures after your joint replacement. Speak with your physician about this at your post-op office visit.  Eat a balanced diet high in fiber and drink plenty of fluids.   Continue using incentive spirometry because narcotics make you sleepy so you may not take good deep breaths. We do not want you to get pneumonia.     Post op Office visits  Schedule 10 days to 3 weeks after surgery WITH SURGEON’s office.  Additional visits may need to be scheduled. Your physician will discuss this at first post-op office visit.  Schedule outpatient physical therapy per your surgeon’s orders.  Schedule one week follow up after surgery WITH PRIMARY CARE PHYSICIAN; review your medications over last 6 months. Your body gets stressed by surgery and that stress can affect all your other health issues (such as high blood pressure, diabetes, CHF, afib, and asthma just to name a few).  We don’t want those other health issues to cause you to get readmitted to the hospital; much better for you to catch developing problems and prevent them from becoming larger ones.  MICHA HOSE - IF ordered by your surgeon, wear these during the day and off at night.      Notify your surgeon if you notice any  of the following signs  Separation of incision line.  Increased redness, swelling, or warmth of skin around incision.  Increased or foul smelling drainage from incision  Red streaks on skin near incision.  Temperature >101 F.  Increased pain at incision not relieved by pain medication.      Signs of blood clot  Pain, excessive tenderness, redness, or swelling in leg or calf (other than incision site).  (CALL SURGEON)    Go directly to the ER or CALL 911 if  you:  become short of breath  have chest pain  cough up blood  have unexplained anxiety with breathing  Traveling and Handicapped  parking  Check with you surgeon when allowed so you don’t set yourself up for greater chance of complications.  If traveling by car, get out to stretch every 2 hours.  This helps prevent stiffness.  You may need to do this any time you travel for the first year after surgery.  If traveling by plane, BEFORE you get into a security line, let them know that you had your hip replaced, as you will most likely set off the metal detector. The doctors no longer provide an identification card for this as they are easily copied. ALSO request a wheelchair the first year to board and get off a plane…this aids in priority seating and you should sit on the aisle or at the bulkhead where you can easily stretch your legs and get up to walk up and down the aisles…this helps prevent blood clots and stiffness.  TEMPORARY HANDICAP PARKING APPLICATION  (good for 3-6 months)  - At Surgeon or PCP visit, request they fill out the form, then go to [facility] (only time you do not wait in a long line there). Some [facility] offices provide the same service. ([facility]olai Zapata and [place] have this service; if you live in another [facility], you may check with them as well). You need space to open car doors to position yourself properly with walker to get in and out of your car safely; some parking spaces are  practically on top of each other and do not give you enough room.    SPECIAL  INSTRUCTIONS:          View knee discharge education video at [email or web address] .  Choose + after surgery      Tubigrip knee replacement (double layer compression sleeve):  All patients should wear the compression sleeves (TUBIGRIP) until first follow up visit to surgeon’s office ( about 2-3 weeks) and then check with surgeon if need to continue use.  Take off to shower. Best to keep on as much as possible, even at night.  Wash with mild soap and water; DRIP dry overnight.    Directions to put on and off:   IT TAKES 2 PIECES OF TUBGRIP (compression  sleeves), (USUALLY DIFFERENT SIZES because a calf is usually smaller than a knee.)   Use the longer tube (tubigrip/compression sleeve) pulled up over the calf and fold it back on itself creating a double layer.   This 1st piece (tubigrip/compression sleeve) should be on the calf part of the leg, from just below the knee to the ball of the foot to expose the toes.  IF this is difficult to fold back, you can cut the Tubigrip (compression sleeve) into TWO equal parts. Slide one part on and then the second one directly on top of the first.    The 2nd piece (shorter compression sleeve) is pulled over and past the first sleeve onto the knee.  Fold it back on itself also creating a double layer. The lower edge of the knee portion should overlap the top of the calf tubigrip by a few inches. This is the only place where the 2 different pieces overlap.   The top of the second tubigrip should be about a few inches above the knee but it should NOT be rolling down. The tubigrip should be flat so that it does not roll and become too tight.  Makes sure it is NOT bunched up anywhere.   IF the tubigrip feels TOO tight, then cut each of tubigrip pieces in half and only use one layer on the calf and one layer on the knee.  IF the tubigrip still feels too tight after using only one layer, remove it and call your surgeon to let them know.      Using an Incentive Spirometer  An incentive spirometer is a device that helps you do deep breathing exercises after surgery. Or it helps lower the risk for breathing problems if you have a lung disease or condition. These exercises expand your lungs, aid in circulation, and may help prevent pneumonia. Deep breathing exercises also help you breathe better and improve the function of your lungs by:   Keeping your lungs clear  Strengthening your breathing muscles  Helping prevent respiratory complications or problems  The incentive spirometer gives you a way to take an active part in your recovery. A  nurse or respiratory therapist will teach you breathing exercises. To do these exercises, you will breathe in through your mouth and not your nose. The incentive spirometer only works correctly if you breathe in through your mouth.      Deep breathing expands the lungs, aids circulation, and helps prevent pneumonia.     Your healthcare provider or their staff will tell you how to use the device, your targeted volume(s), and provide other helpful tips to prevent complications (such as pain, dizziness, feeling lightheaded) when blowing in the incentive spirometer.   Steps to clear lungs  Step 1. Exhale normally. Then, inhale normally.   Relax and breathe out.  Step 2. Place your lips tightly around the mouthpiece.   Make sure the device is upright and not tilted.  Sit up and breathe out (exhale) fully  Tightly seal your lips around the mouthpiece  Step 3. Inhale as much air as you can through the mouthpiece. Don't breathe through your nose.   Breathe in (inhale) slowly and deeply.  Hold your breath long enough to keep the balls, piston, or disk raised for at least 3 to 5seconds, or as instructed by your healthcare provider.  Exhale slowly to allow the balls, piston, or disk to fall before repeating again.  Note: Some spirometers have an indicator to let you know that you are breathing in too fast. If the indicator goes off, breathe in more slowly.   Step 4. Repeat the exercise regularly.   Do sets of 10 exercises every hour while you're awake, or as instructed by your healthcare provider. Don't do more than 30 breaths in each set.  If you were taught deep breathing and coughing exercises, do them regularly as instructed by your provider, nurse, or respiratory therapist.    Follow-up care  Make a follow-up appointment, or as directed by your healthcare provider. Also follow up with your provider as advised if your symptoms don't improve or continue to get worse.   When to call your healthcare provider   Call your  healthcare provider right away if you have any of these:   Fever 100.4° (38°C) or higher, or as advised by your provider  Brownish, bloody, or smelly sputum (phlegm that you cough up)  Call 911  Call 911 if any of these occur:    Shortness of breath that doesn't get better after taking your medicine  Cool, moist, pale, or blue skin  Trouble breathing or swallowing, wheezing  Fainting or loss of consciousness  Feeling of dizziness or weakness, or a sudden drop in blood pressure  Feeling very ill  Lightheadedness  Chest pain or rapid heart rate  [employer] last reviewed this educational content on 6/1/2022 © 2000-2023 [employer]. All rights reserved. This information is not intended as a substitute for professional medical care. Always follow your healthcare professional's instructions.

## 2024-02-21 NOTE — ANESTHESIA POSTPROCEDURE EVALUATION
[facility]    Tita Tillman Patient Status:  Hospital Outpatient Surgery   Age/Gender 68 year old female MRN [id number]   Location [facility] SURGERY Attending Tod Ferreira MD   Hosp Day # 0 PCP Lydia Newton MD       Anesthesia Post-op Note    RIGHT TOTAL KNEE ARTHROPLASTY    Procedure Summary       Date: 02/21/24 Room / Location:  MAIN OR 14 / [facility] MAIN OR    Anesthesia Start: 1037 Anesthesia Stop: 1147    Procedure: RIGHT TOTAL KNEE ARTHROPLASTY (Right: Knee) Diagnosis: (PRIMARY OSTEOARTHRITIS OF RIGHT KNEE)    Surgeons: Tod Ferreira MD Anesthesiologist: Stanley Bahena MD    Anesthesia Type: spinal ASA Status: 3            Anesthesia Type: spinal    Vitals Value Taken Time   /56 02/21/24 1148   Temp  02/21/24 1148   Pulse 91 02/21/24 1148   Resp 18 02/21/24 1148   SpO2 98 02/21/24 1148       Patient Location: PACU    Anesthesia Type: spinal    Airway Patency: patent    Postop Pain Control: adequate    Mental Status: preanesthetic baseline    Nausea/Vomiting: none    Cardiopulmonary/Hydration status: stable euvolemic    Complications: no apparent anesthesia related complications    Postop vital signs: stable    Dental Exam: Unchanged from Preop    Patient to be discharged from PACU when criteria met.

## 2024-02-21 NOTE — CM/SW NOTE
02/21/24 1200   Discharge disposition   Expected discharge disposition Home[facility]Health   Post Acute Care Provider [facility]     Pre-op plan [facility].  RN or PT to update sw if dc needs change.    BOB Del AngelW  /Discharge Planner

## 2024-02-21 NOTE — ANESTHESIA PROCEDURE NOTES
Regional Block    Date/Time: 2/21/2024 10:47 AM    Performed by: Stanley Bahena MD  Authorized by: Stanley Bahena MD      General Information and Staff    Start Time:  2/21/2024 10:47 AM  End Time:  2/21/2024 10:49 AM  Anesthesiologist:  Stanley Bahena MD  Performed by:  Anesthesiologist  Patient Location:  OR    Block Placement: Pre Induction  Site Identification: real time ultrasound guided and image stored and retrievable    Block site/laterality marked before start: site marked  Reason for Block: at surgeon's request and post-op pain management    Preanesthetic Checklist: 2 patient identifers, IV checked, risks and benefits discussed, monitors and equipment checked, pre-op evaluation, timeout performed, anesthesia consent, sterile technique used, no prohibitive neurological deficits and no local skin infection at insertion site      Procedure Details    Patient Position:  Supine  Prep: ChloraPrep    Monitoring:  Cardiac monitor, continuous pulse ox and blood pressure cuff  Block Type:  Adductor canal  Laterality:  Right  Injection Technique:  Single-shot    Needle    Needle Type:  Short-bevel and echogenic  Needle Gauge:  21 G  Needle Length:  110 mm  Needle Localization:  Ultrasound guidance  Reason for Ultrasound Use: appropriate spread of the medication was noted in real time and no ultrasound evidence of intravascular and/or intraneural injection            Assessment    Injection Assessment:  Good spread noted, negative resistance, negative aspiration for heme, incremental injection and low pressure  Heart Rate Change: No    - Patient tolerated block procedure well without evidence of immediate block related complications.     Medications  2/21/2024 10:47 AM      Additional Comments    Medication:  Ropivacaine 0.5% 20mL with 1:200,000 epi and dexamethasone 4mg PF.

## 2024-02-21 NOTE — INTERVAL H&P NOTE
Pre-op Diagnosis: PRIMARY OSTEOARTHRITIS OF RIGHT KNEE    The above referenced H&P was reviewed by Tod Ferreira MD on 2/21/2024, the patient was examined and no significant changes have occurred in the patient's condition since the H&P was performed.  I discussed with the patient and/or legal representative the potential benefits, risks and side effects of this procedure; the likelihood of the patient achieving goals; and potential problems that might occur during recuperation.  I discussed reasonable alternatives to the procedure, including risks, benefits and side effects related to the alternatives and risks related to not receiving this procedure.  We will proceed with procedure as planned.

## 2024-03-27 ENCOUNTER — HOSPITAL ENCOUNTER (OUTPATIENT)
Age: 69
Discharge: HOME | End: 2024-03-27
Payer: MEDICARE

## 2024-03-27 VITALS
BODY MASS INDEX: 28.32 KG/M2 | WEIGHT: 170 LBS | DIASTOLIC BLOOD PRESSURE: 90 MMHG | TEMPERATURE: 98 F | HEART RATE: 88 BPM | HEIGHT: 65 IN | RESPIRATION RATE: 18 BRPM | OXYGEN SATURATION: 98 % | SYSTOLIC BLOOD PRESSURE: 170 MMHG

## 2024-03-27 DIAGNOSIS — S61.215A LACERATION OF LEFT RING FINGER WITHOUT FOREIGN BODY WITHOUT DAMAGE TO NAIL, INITIAL ENCOUNTER: Primary | ICD-10-CM

## 2024-03-27 PROCEDURE — 90471 IMMUNIZATION ADMIN: CPT | Performed by: PHYSICIAN ASSISTANT

## 2024-03-27 PROCEDURE — 12001 RPR S/N/AX/GEN/TRNK 2.5CM/<: CPT | Performed by: PHYSICIAN ASSISTANT

## 2024-03-27 PROCEDURE — 90715 TDAP VACCINE 7 YRS/> IM: CPT | Performed by: PHYSICIAN ASSISTANT

## 2024-03-27 PROCEDURE — 99203 OFFICE O/P NEW LOW 30 MIN: CPT | Performed by: PHYSICIAN ASSISTANT

## 2024-03-27 NOTE — DISCHARGE INSTRUCTIONS
Keep the area dry for the next 24 hours. Apply bacitracin antibiotic daily for the first 48 hours after sutures are placed.  Keep the area covered with a bandage for the first 48 hours changing daily or if dressing is dirty or saturated.       You may let water run over the area 24 hours after sutures are placed. Do not submerge the area in water until sutures are removed.  Do not scrub the area.      The wound may be left to open air after the first 2 days when you are at home and not in danger of infecting the wound.  It is recommended to keep the wound covered at work and while sleeping.  Otherwise it may be left to open air for ventilation.

## 2024-03-27 NOTE — ED PROVIDER NOTES
Patient Seen in: [facility]      History     Chief Complaint   Patient presents with    Laceration/Abrasion     Stated Complaint: Cut a finger    Subjective:   HPI  67yo F who comes in today after accidentally cutting her left ring finger with a serrated knife while cutting a dinner rolls, patient denies any other injury. Flap laceration noted, last TDAP 2013. Patient is Right hand dominant     Objective:   Past Medical History:   Diagnosis Date    Aortic valve disorder     ASTHMA     Asthma (HCC)     GERD     High blood pressure     High cholesterol     Hx of adenomatous colonic polyps 2014    Hypercholesterolemia     Hypertension     Moderate aortic stenosis 2012    Occlusion and stenosis of carotid artery without mention of cerebral infarction     Other and unspecified hyperlipidemia 05/16/2008    Prediabetes 2017    SEASONAL ALLERGIES     Unspecified hypothyroidism 05/16/2008              Past Surgical History:   Procedure Laterality Date    COLONOSCOPY  07/2019    MAC: diverticulosis, int hem, repeat 2029    COLONOSCOPY,BIOPSY N/A 12/30/2014    Procedure: ESOPHAGOGASTRODUODENOSCOPY, COLONOSCOPY, POSSIBLE BIOPSY, POSSIBLE POLYPECTOMY 14457,03484;  Surgeon: Katarina Baeza MD;  Location: [facility]    CT HEART W/ CALCIUM SCORING  10/2009    0    EGD  07/2019    MAC: gastritis, gastric polyps, esophagitis (LA grade A), non obstructive esophageal stricture, hiatal hernia, dilated to 20mm, bx neg for HP, celiac, Lazar's. Polyps are fundic gland polyps    EGD N/A 07/30/2019    Procedure: ESOPHAGOGASTRODUODENOSCOPY, COLONOSCOPY, POSSIBLE BIOPSY, POSSIBLE POLYPECTOMY 82091, 55765;  Surgeon: Katarina Baeza MD;  Location: [facility]    OTHER SURGICAL HISTORY      oral    UPPER GI ENDOSCOPY,BIOPSY N/A 12/30/2014    Procedure: ESOPHAGOGASTRODUODENOSCOPY, COLONOSCOPY, POSSIBLE BIOPSY, POSSIBLE POLYPECTOMY 63584,21398;  Surgeon: Katarina Baeza MD;  [place]  [facility]                Social History     Socioeconomic History    Marital status:     Number of children: 4   Tobacco Use    Smoking status: Never    Smokeless tobacco: Never   Vaping Use    Vaping Use: Never used   Substance and Sexual Activity    Alcohol use: Yes    Drug use: No   Other Topics Concern    Caffeine Concern Yes     Comment: 1 cup coffee/day    Exercise Yes     Comment: walking              Review of Systems    Positive for stated complaint: Cut a finger  Other systems are as noted in HPI.  Constitutional and vital signs reviewed.      All other systems reviewed and negative except as noted above.    Physical Exam     ED Triage Vitals [03/27/24 1731]   BP (!) 170/101   Pulse 94   Resp 18   Temp 98 °F (36.7 °C)   Temp src Temporal   SpO2 98 %   O2 Device None (Room air)       Current:BP (!) 170/101   Pulse 94   Temp 98 °F (36.7 °C) (Temporal)   Resp 18   Ht 165.1 cm (5' 5\")   Wt 77.1 kg   LMP 12/10/2012   SpO2 98%   BMI 28.29 kg/m²         Physical Exam  Vitals and nursing note reviewed.   Constitutional:       General: She is not in acute distress.     Appearance: Normal appearance. She is well-developed. She is not diaphoretic.   HENT:      Head: Normocephalic and atraumatic.   Cardiovascular:      Rate and Rhythm: Normal rate and regular rhythm.   Pulmonary:      Effort: Pulmonary effort is normal. No respiratory distress.      Breath sounds: Normal breath sounds.   Musculoskeletal:      Cervical back: Normal range of motion.   Skin:     General: Skin is warm and dry.      Capillary Refill: Capillary refill takes less than 2 seconds.      Coloration: Skin is not pale.      Findings: No erythema or rash.      Comments: 1cm flap laceration to palmar left 4th distal phalanx, no tendon involvement, good capillary refill and distal sensation    Neurological:      Mental Status: She is alert and oriented to person, place, and time.      Motor: No abnormal muscle tone.       Coordination: Coordination normal.      Deep Tendon Reflexes: Reflexes are normal and symmetric.   Psychiatric:         Behavior: Behavior normal.         Thought Content: Thought content normal.         Judgment: Judgment normal.             ED Course   Labs Reviewed - No data to display    PROCEDURE NOTE:  Laceration Repair    Site: Left ring finger   Size: 1 cm    Laceration repair: Prior to procedure, documentation was reviewed, informed consent was obtained, appropriate equipment was present and a time out was performed to identify the correct patient, procedure and site.     Verbal consent was obtained from the patient.  The  laceration was anesthetized in the usual fashion with 2mL of 1% lidocaine w/o epinephrine by digital block. The wound was irrigated with normal saline, draped and explored to its base with a gloved finger.   There were no deep structures involved.  No Foreign bodies.   No tendon injury was identified.  The wound was reapproximated using 3, 5-0 prolene sutures interrupted sutures.  The wound repair was simple.  The procedure was performed by PA student under supervision of myself.     MDM             Medical Decision Making  68-year-old female with left ring finger laceration that occurred today from cutting bread    Problems Addressed:  Laceration of left ring finger without foreign body without damage to nail, initial encounter: acute illness or injury    Amount and/or Complexity of Data Reviewed  ECG/medicine tests: ordered and independent interpretation performed. Decision-making details documented in ED Course.     Details: Tdap  Asepsis     Risk  OTC drugs.  Risk Details: Clinical Impression: Left ring finger laceration without tendon involvement      The differential diagnosis before testing included laceration, tendon laceration, open fracture, which is a medical condition that poses a threat to life/function.     Laceration instructions reviewed suture removal in 7 to 10  days        Disposition and Plan     Clinical Impression:  1. Laceration of left ring finger without foreign body without damage to nail, initial encounter         Disposition:  Discharge  3/27/2024  6:12 pm    Follow-up:  [facility] [address] [address]  [phone]  In 1 week  For suture removal          Medications Prescribed:  Current Discharge Medication List          This report has been produced using speech recognition software and may contain errors related to that system including, but not limited to, errors in grammar, punctuation, and spelling, as well as words and phrases that possibly may have been recognized inappropriately.  If there are any questions or concerns, contact the dictating provider for clarification.     NOTE: The 21st Century Cares Act makes medical notes available to patients.  Be advised that this is a medical document written in medical language and may contain abbreviations or verbiage that is unfamiliar or direct.  It is primarily intended to carry relevant historical information, physical exam findings, and the clinical assessment of the physician.

## 2024-04-03 ENCOUNTER — HOSPITAL ENCOUNTER (OUTPATIENT)
Age: 69
Discharge: HOME | End: 2024-04-03
Payer: MEDICARE

## 2024-04-03 VITALS
BODY MASS INDEX: 28.32 KG/M2 | DIASTOLIC BLOOD PRESSURE: 91 MMHG | HEIGHT: 65 IN | HEART RATE: 70 BPM | RESPIRATION RATE: 18 BRPM | SYSTOLIC BLOOD PRESSURE: 151 MMHG | WEIGHT: 170 LBS | TEMPERATURE: 98 F | OXYGEN SATURATION: 99 %

## 2024-04-03 DIAGNOSIS — Z48.02 ENCOUNTER FOR REMOVAL OF SUTURES: Primary | ICD-10-CM

## 2024-04-03 PROCEDURE — 99024 POSTOP FOLLOW-UP VISIT: CPT | Performed by: PHYSICIAN ASSISTANT

## 2024-04-03 NOTE — ED PROVIDER NOTES
Patient Seen in: [facility]      History     Chief Complaint   Patient presents with    Sut Stap RingRemoval     Stated Complaint: Stich removal    Subjective:   HPI    67 YO female presents to immediate care for suture removal at the left ring finger. Laceration repair on 3/27.          Objective:   Past Medical History:   Diagnosis Date    Aortic valve disorder     ASTHMA     Asthma (HCC)     GERD     High blood pressure     High cholesterol     Hx of adenomatous colonic polyps 2014    Hypercholesterolemia     Hypertension     Moderate aortic stenosis 2012    Occlusion and stenosis of carotid artery without mention of cerebral infarction     Other and unspecified hyperlipidemia 05/16/2008    Prediabetes 2017    SEASONAL ALLERGIES     Unspecified hypothyroidism 05/16/2008              Past Surgical History:   Procedure Laterality Date    COLONOSCOPY  07/2019    MAC: diverticulosis, int hem, repeat 2029    COLONOSCOPY,BIOPSY N/A 12/30/2014    Procedure: ESOPHAGOGASTRODUODENOSCOPY, COLONOSCOPY, POSSIBLE BIOPSY, POSSIBLE POLYPECTOMY 41233,50196;  Surgeon: Katarina Baeza MD;  Location: [facility]    CT HEART W/ CALCIUM SCORING  10/2009    0    EGD  07/2019    MAC: gastritis, gastric polyps, esophagitis (LA grade A), non obstructive esophageal stricture, hiatal hernia, dilated to 20mm, bx neg for HP, celiac, Lazar's. Polyps are fundic gland polyps    EGD N/A 07/30/2019    Procedure: ESOPHAGOGASTRODUODENOSCOPY, COLONOSCOPY, POSSIBLE BIOPSY, POSSIBLE POLYPECTOMY 02288, 23581;  Surgeon: Katarina Baeza MD;  Location: [facility]    OTHER SURGICAL HISTORY      oral    UPPER GI ENDOSCOPY,BIOPSY N/A 12/30/2014    Procedure: ESOPHAGOGASTRODUODENOSCOPY, COLONOSCOPY, POSSIBLE BIOPSY, POSSIBLE POLYPECTOMY 85809,84440;  Surgeon: Katarina Baeza MD;  Location: [facility]                Social History     Socioeconomic History    Marital status:      Number of children: 4   Tobacco Use    Smoking status: Never    Smokeless tobacco: Never   Vaping Use    Vaping Use: Never used   Substance and Sexual Activity    Alcohol use: Yes    Drug use: No   Other Topics Concern    Caffeine Concern Yes     Comment: 1 cup coffee/day    Exercise Yes     Comment: walking              Review of Systems    Positive for stated complaint: Stich removal  Other systems are as noted in HPI.  Constitutional and vital signs reviewed.      All other systems reviewed and negative except as noted above.    Physical Exam     ED Triage Vitals [04/03/24 1146]   BP (!) 159/96   Pulse 70   Resp 18   Temp 97.5 °F (36.4 °C)   Temp src    SpO2 99 %   O2 Device None (Room air)       Current:BP (!) 151/91   Pulse 70   Temp 97.5 °F (36.4 °C)   Resp 18   Ht 165.1 cm (5' 5\")   Wt 77.1 kg   LMP 12/10/2012   SpO2 99%   BMI 28.29 kg/m²         Physical Exam  Vitals and nursing note reviewed.   Constitutional:       General: She is not in acute distress.     Appearance: Normal appearance. She is not ill-appearing, toxic-appearing or diaphoretic.   Skin:     Comments: 3 intact sutures at the left fourth distal phalanx   Neurological:      Mental Status: She is alert.         ED Course   Labs Reviewed - No data to display         MDM      69 YO female presents to immediate care for suture removal at the left ring finger.     Differential diagnosis considered but not limited to healing laceration, dehiscence, cellulitis    3 intact sutures at the left fourth distal phalanx. No signs of secondary bacterial infection.  3 sutures removed without complication. Patient tolerated well. Home care and return instructions discussed with understanding.      Medical Decision Making      Disposition and Plan     Clinical Impression:  1. Encounter for removal of sutures         Disposition:  Discharge  4/3/2024 12:10 pm    Follow-up:  No follow-up provider specified.        Medications Prescribed:  Discharge  Medication List as of 4/3/2024 12:11 PM

## (undated) DEVICE — PACK PBDS TOTAL KNEE MODULE

## (undated) DEVICE — GOWN SUR 2XL LEV 4 BLU W/ WHT V NK BND AERO

## (undated) DEVICE — BANDAGE ELASTIC ACE 4" STERILE

## (undated) DEVICE — STOCKINETTE SUR W12XL48IN STD HLLW IMPERV OTR

## (undated) DEVICE — SUTURE COAT VCRL 2-0 27IN ABSRB UD 36MM CP-1

## (undated) DEVICE — SOLUTION IRRIG 3000ML 0.9% NACL FLX CONT

## (undated) DEVICE — GUIDEPIN ORTH THRD HI PERF HD SIG

## (undated) DEVICE — SYSTEM IRRISEPT WND IRR 0.05%

## (undated) DEVICE — BLADE SAW W25XL90MM THK1.19MM OSC GROUNDED

## (undated) DEVICE — COVER LT HNDL RIG FOR SUR CAM DISP

## (undated) DEVICE — PIN DRL THRD HDLSS SIG

## (undated) DEVICE — HOOD STERI-SHIELD 408-800-100

## (undated) DEVICE — SLEEVE COMPR M KNEE LEN SGL USE KENDALL SCD

## (undated) DEVICE — GLOVE SUR 8 SENSICARE PI PIP GRN PWD F

## (undated) DEVICE — UNIVERSAL STERIBUMP STRL LF

## (undated) DEVICE — GLOVE SURGICAL 7.5 SENSICARE P

## (undated) DEVICE — BOWL BNE CEM MIX DISP QUIK-VAC

## (undated) DEVICE — TOURNIQUET SURG W4XL34IN PUR 2 PRT QC SGL

## (undated) DEVICE — Device

## (undated) DEVICE — HOOD STERI-SHIELD 408-800-000

## (undated) DEVICE — SUTURE VCRL 1 L27IN ABSRB UD L36MM OS-6 1/2

## (undated) DEVICE — SUTURE QUILL 2 L36CM ABSRB VLT L48MM CTX 1/2

## (undated) DEVICE — DRAPE SURG L W60XL84IN SMS POLYPR U SHP FLM

## (undated) DEVICE — PENCIL ES BTTN SWCH W/ TIP HOLSTER E-Z CLN

## (undated) DEVICE — SEALER BPLR ELECTRD L5.74MM DIA3.48MM CNTR

## (undated) DEVICE — SYRINGE MED 30ML STD CLR PLAS LL TIP N CTRL

## (undated) DEVICE — NEEDLE SPNL 18GA L3.5IN PNK HUB SS RW FIT

## (undated) DEVICE — GLOVE SUR 8 SENSICARE PI PIP CRM PWD F

## (undated) DEVICE — WRAP COMPR UNIV KNEE HOT CLD GEL MICWV AND

## (undated) DEVICE — GLOVE SUR 8.5 SENSICARE PI PIP CRM PWD F

## (undated) NOTE — LETTER
OUTSIDE TESTING RESULT REQUEST     IMPORTANT: FOR YOUR IMMEDIATE ATTENTION  Please FAX all test results listed below to: [phone]       * * * * If testing is NOT complete, arrange with patient A.S.A.P. * * * *      Patient Name: Tita Tillman  Surgery Date: 2024  Medical Record: [id number]  CSN: [id number]  : 1955 - A: 68 y     Sex: female  Surgeon(s):  Tod Ferreira MD  Procedure: RIGHT TOTAL KNEE ARTHROPLASTY  Anesthesia Type: General     Surgeon: Tod Ferreira MD     The following Testing and Time Line are REQUIRED PER ANESTHESIA     EKG READ AND SIGNED WITHIN   90 days  CBC [with Differential & Platelets] within  90 days  CMP (requires 4 hour fast) within  90 days  PT/INR within  30 days  PTT within  30 days  Type and Screen for Pre-Admission Testing (must be within 28 days of surgery)  MSSA/MRSA Nasal screening within 30 days      Thank You,   Sent by: TREMAYNE HARRIS